# Patient Record
Sex: FEMALE | Race: ASIAN | URBAN - METROPOLITAN AREA
[De-identification: names, ages, dates, MRNs, and addresses within clinical notes are randomized per-mention and may not be internally consistent; named-entity substitution may affect disease eponyms.]

---

## 2019-06-03 ENCOUNTER — APPOINTMENT (OUTPATIENT)
Dept: ULTRASOUND IMAGING | Facility: CLINIC | Age: 34
End: 2019-06-03
Attending: EMERGENCY MEDICINE
Payer: COMMERCIAL

## 2019-06-03 ENCOUNTER — HOSPITAL ENCOUNTER (EMERGENCY)
Facility: CLINIC | Age: 34
Discharge: HOME | End: 2019-06-03
Attending: EMERGENCY MEDICINE | Admitting: EMERGENCY MEDICINE
Payer: COMMERCIAL

## 2019-06-03 VITALS
SYSTOLIC BLOOD PRESSURE: 139 MMHG | RESPIRATION RATE: 14 BRPM | OXYGEN SATURATION: 97 % | TEMPERATURE: 98.2 F | HEART RATE: 78 BPM | DIASTOLIC BLOOD PRESSURE: 91 MMHG

## 2019-06-03 DIAGNOSIS — K52.9 GASTROENTERITIS: ICD-10-CM

## 2019-06-03 LAB
ALBUMIN SERPL-MCNC: 4.1 G/DL (ref 3.4–5)
ALBUMIN UR-MCNC: NEGATIVE MG/DL
ALP SERPL-CCNC: 82 U/L (ref 40–150)
ALT SERPL W P-5'-P-CCNC: 21 U/L (ref 0–50)
ANION GAP SERPL CALCULATED.3IONS-SCNC: 7 MMOL/L (ref 3–14)
APPEARANCE UR: CLEAR
AST SERPL W P-5'-P-CCNC: 17 U/L (ref 0–45)
BACTERIA #/AREA URNS HPF: ABNORMAL /HPF
BASOPHILS # BLD AUTO: 0 10E9/L (ref 0–0.2)
BASOPHILS NFR BLD AUTO: 0.3 %
BILIRUB SERPL-MCNC: 0.6 MG/DL (ref 0.2–1.3)
BILIRUB UR QL STRIP: NEGATIVE
BUN SERPL-MCNC: 10 MG/DL (ref 7–30)
CALCIUM SERPL-MCNC: 9 MG/DL (ref 8.5–10.1)
CHLORIDE SERPL-SCNC: 105 MMOL/L (ref 94–109)
CO2 SERPL-SCNC: 24 MMOL/L (ref 20–32)
COLOR UR AUTO: ABNORMAL
CREAT SERPL-MCNC: 0.84 MG/DL (ref 0.52–1.04)
DIFFERENTIAL METHOD BLD: ABNORMAL
EOSINOPHIL # BLD AUTO: 0 10E9/L (ref 0–0.7)
EOSINOPHIL NFR BLD AUTO: 0.5 %
ERYTHROCYTE [DISTWIDTH] IN BLOOD BY AUTOMATED COUNT: 16.4 % (ref 10–15)
GFR SERPL CREATININE-BSD FRML MDRD: >90 ML/MIN/{1.73_M2}
GLUCOSE SERPL-MCNC: 119 MG/DL (ref 70–99)
GLUCOSE UR STRIP-MCNC: NEGATIVE MG/DL
HCG SERPL QL: NEGATIVE
HCT VFR BLD AUTO: 38.9 % (ref 35–47)
HGB BLD-MCNC: 12.3 G/DL (ref 11.7–15.7)
HGB UR QL STRIP: NEGATIVE
IMM GRANULOCYTES # BLD: 0 10E9/L (ref 0–0.4)
IMM GRANULOCYTES NFR BLD: 0.5 %
INTERPRETATION ECG - MUSE: NORMAL
KETONES UR STRIP-MCNC: NEGATIVE MG/DL
LEUKOCYTE ESTERASE UR QL STRIP: ABNORMAL
LIPASE SERPL-CCNC: 62 U/L (ref 73–393)
LYMPHOCYTES # BLD AUTO: 2.1 10E9/L (ref 0.8–5.3)
LYMPHOCYTES NFR BLD AUTO: 26.5 %
MCH RBC QN AUTO: 24.6 PG (ref 26.5–33)
MCHC RBC AUTO-ENTMCNC: 31.6 G/DL (ref 31.5–36.5)
MCV RBC AUTO: 78 FL (ref 78–100)
MONOCYTES # BLD AUTO: 0.4 10E9/L (ref 0–1.3)
MONOCYTES NFR BLD AUTO: 5.3 %
MUCOUS THREADS #/AREA URNS LPF: PRESENT /LPF
NEUTROPHILS # BLD AUTO: 5.3 10E9/L (ref 1.6–8.3)
NEUTROPHILS NFR BLD AUTO: 66.9 %
NITRATE UR QL: NEGATIVE
NRBC # BLD AUTO: 0 10*3/UL
NRBC BLD AUTO-RTO: 0 /100
PH UR STRIP: 6 PH (ref 5–7)
PLATELET # BLD AUTO: 281 10E9/L (ref 150–450)
POTASSIUM SERPL-SCNC: 3.9 MMOL/L (ref 3.4–5.3)
PROT SERPL-MCNC: 8.7 G/DL (ref 6.8–8.8)
RBC # BLD AUTO: 4.99 10E12/L (ref 3.8–5.2)
RBC #/AREA URNS AUTO: 1 /HPF (ref 0–2)
SODIUM SERPL-SCNC: 136 MMOL/L (ref 133–144)
SOURCE: ABNORMAL
SP GR UR STRIP: 1.01 (ref 1–1.03)
SQUAMOUS #/AREA URNS AUTO: 5 /HPF (ref 0–1)
UROBILINOGEN UR STRIP-MCNC: NORMAL MG/DL (ref 0–2)
WBC # BLD AUTO: 7.9 10E9/L (ref 4–11)
WBC #/AREA URNS AUTO: 4 /HPF (ref 0–5)

## 2019-06-03 PROCEDURE — 85025 COMPLETE CBC W/AUTO DIFF WBC: CPT | Performed by: EMERGENCY MEDICINE

## 2019-06-03 PROCEDURE — 96361 HYDRATE IV INFUSION ADD-ON: CPT

## 2019-06-03 PROCEDURE — 76700 US EXAM ABDOM COMPLETE: CPT

## 2019-06-03 PROCEDURE — 81001 URINALYSIS AUTO W/SCOPE: CPT | Performed by: EMERGENCY MEDICINE

## 2019-06-03 PROCEDURE — 96374 THER/PROPH/DIAG INJ IV PUSH: CPT

## 2019-06-03 PROCEDURE — 25000125 ZZHC RX 250: Performed by: EMERGENCY MEDICINE

## 2019-06-03 PROCEDURE — 84703 CHORIONIC GONADOTROPIN ASSAY: CPT | Performed by: EMERGENCY MEDICINE

## 2019-06-03 PROCEDURE — 25000128 H RX IP 250 OP 636: Performed by: EMERGENCY MEDICINE

## 2019-06-03 PROCEDURE — 93005 ELECTROCARDIOGRAM TRACING: CPT

## 2019-06-03 PROCEDURE — 83690 ASSAY OF LIPASE: CPT | Performed by: EMERGENCY MEDICINE

## 2019-06-03 PROCEDURE — 96375 TX/PRO/DX INJ NEW DRUG ADDON: CPT

## 2019-06-03 PROCEDURE — 99285 EMERGENCY DEPT VISIT HI MDM: CPT | Mod: 25

## 2019-06-03 PROCEDURE — 80053 COMPREHEN METABOLIC PANEL: CPT | Performed by: EMERGENCY MEDICINE

## 2019-06-03 RX ORDER — MORPHINE SULFATE 4 MG/ML
4 INJECTION, SOLUTION INTRAMUSCULAR; INTRAVENOUS
Status: COMPLETED | OUTPATIENT
Start: 2019-06-03 | End: 2019-06-03

## 2019-06-03 RX ORDER — SODIUM CHLORIDE 9 MG/ML
1000 INJECTION, SOLUTION INTRAVENOUS CONTINUOUS
Status: DISCONTINUED | OUTPATIENT
Start: 2019-06-03 | End: 2019-06-03 | Stop reason: HOSPADM

## 2019-06-03 RX ORDER — OXYCODONE HYDROCHLORIDE 5 MG/1
5 TABLET ORAL EVERY 6 HOURS PRN
Qty: 5 TABLET | Refills: 0 | Status: SHIPPED | OUTPATIENT
Start: 2019-06-03

## 2019-06-03 RX ORDER — ONDANSETRON 2 MG/ML
4 INJECTION INTRAMUSCULAR; INTRAVENOUS ONCE
Status: COMPLETED | OUTPATIENT
Start: 2019-06-03 | End: 2019-06-03

## 2019-06-03 RX ORDER — METOCLOPRAMIDE HYDROCHLORIDE 5 MG/ML
10 INJECTION INTRAMUSCULAR; INTRAVENOUS ONCE
Status: COMPLETED | OUTPATIENT
Start: 2019-06-03 | End: 2019-06-03

## 2019-06-03 RX ORDER — ONDANSETRON 4 MG/1
4 TABLET, ORALLY DISINTEGRATING ORAL EVERY 6 HOURS PRN
Qty: 10 TABLET | Refills: 0 | Status: SHIPPED | OUTPATIENT
Start: 2019-06-03 | End: 2019-06-06

## 2019-06-03 RX ADMIN — MORPHINE SULFATE 4 MG: 4 INJECTION INTRAVENOUS at 02:47

## 2019-06-03 RX ADMIN — ONDANSETRON 4 MG: 2 INJECTION INTRAMUSCULAR; INTRAVENOUS at 01:02

## 2019-06-03 RX ADMIN — FAMOTIDINE 20 MG: 10 INJECTION INTRAVENOUS at 02:43

## 2019-06-03 RX ADMIN — METOCLOPRAMIDE 10 MG: 5 INJECTION, SOLUTION INTRAMUSCULAR; INTRAVENOUS at 02:45

## 2019-06-03 RX ADMIN — SODIUM CHLORIDE 1000 ML: 9 INJECTION, SOLUTION INTRAVENOUS at 02:15

## 2019-06-03 ASSESSMENT — ENCOUNTER SYMPTOMS
ABDOMINAL PAIN: 1
FEVER: 0
BLOOD IN STOOL: 0
DYSURIA: 0
HEMATURIA: 0
DIARRHEA: 1
NAUSEA: 1
VOMITING: 1

## 2019-06-03 NOTE — ED TRIAGE NOTES
Patient reports upper abd pain that developed  approx 1800, diarrhea, nausea and had 1 emesis

## 2019-06-03 NOTE — ED AVS SNAPSHOT
[facility] Emergency Department  [address]  [facility] [address]  Phone:  [phone]  Fax:  [phone]                                    Maria Del Rosario Robertson   MRN: [id number]    Department:  [facility] Emergency Department   Date of Visit:  6/3/2019           After Visit Summary Signature Page    I have received my discharge instructions, and my questions have been answered. I have discussed any challenges I see with this plan with the nurse or doctor.    ..........................................................................................................................................  Patient/Patient Representative Signature      ..........................................................................................................................................  Patient Representative Print Name and Relationship to Patient    ..................................................               ................................................  Date                                   Time    ..........................................................................................................................................  Reviewed by Signature/Title    ...................................................              ..............................................  Date                                               Time          22EPIC Rev 08/18

## 2025-07-16 ENCOUNTER — HOSPITAL ENCOUNTER (EMERGENCY)
Facility: CLINIC | Age: 40
Discharge: HOME | End: 2025-07-17
Payer: COMMERCIAL

## 2025-07-16 ENCOUNTER — APPOINTMENT (OUTPATIENT)
Dept: ULTRASOUND IMAGING | Facility: CLINIC | Age: 40
End: 2025-07-16
Payer: COMMERCIAL

## 2025-07-16 DIAGNOSIS — I82.461 ACUTE DEEP VEIN THROMBOSIS (DVT) OF CALF MUSCLE VEIN OF RIGHT LOWER EXTREMITY (H): Primary | ICD-10-CM

## 2025-07-16 DIAGNOSIS — I71.21 ANEURYSM OF ASCENDING AORTA WITHOUT RUPTURE: ICD-10-CM

## 2025-07-16 DIAGNOSIS — D64.9 ANEMIA: ICD-10-CM

## 2025-07-16 DIAGNOSIS — R03.0 ELEVATED BLOOD PRESSURE READING: ICD-10-CM

## 2025-07-16 LAB
ALBUMIN SERPL BCG-MCNC: 4.1 G/DL (ref 3.5–5.2)
ALP SERPL-CCNC: 64 U/L (ref 40–150)
ALT SERPL W P-5'-P-CCNC: 19 U/L (ref 0–50)
ANION GAP SERPL CALCULATED.3IONS-SCNC: 12 MMOL/L (ref 7–15)
APTT PPP: 28 SECONDS (ref 22–38)
AST SERPL W P-5'-P-CCNC: 26 U/L (ref 0–45)
BASOPHILS # BLD AUTO: 0 10E3/UL (ref 0–0.2)
BASOPHILS NFR BLD AUTO: 0 %
BILIRUB SERPL-MCNC: 0.2 MG/DL
BUN SERPL-MCNC: 11.9 MG/DL (ref 6–20)
CALCIUM SERPL-MCNC: 9.2 MG/DL (ref 8.8–10.4)
CHLORIDE SERPL-SCNC: 104 MMOL/L (ref 98–107)
CREAT SERPL-MCNC: 0.84 MG/DL (ref 0.51–0.95)
D DIMER PPP FEU-MCNC: 2.72 UG/ML FEU (ref 0–0.5)
EGFRCR SERPLBLD CKD-EPI 2021: 88 ML/MIN/1.73M2
EOSINOPHIL # BLD AUTO: 0.1 10E3/UL (ref 0–0.7)
EOSINOPHIL NFR BLD AUTO: 2 %
ERYTHROCYTE [DISTWIDTH] IN BLOOD BY AUTOMATED COUNT: 16.6 % (ref 10–15)
GLUCOSE SERPL-MCNC: 86 MG/DL (ref 70–99)
HCG SERPL QL: NEGATIVE
HCO3 SERPL-SCNC: 22 MMOL/L (ref 22–29)
HCT VFR BLD AUTO: 34.2 % (ref 35–47)
HGB BLD-MCNC: 10.9 G/DL (ref 11.7–15.7)
IMM GRANULOCYTES # BLD: 0 10E3/UL
IMM GRANULOCYTES NFR BLD: 0 %
INR PPP: 0.96 (ref 0.85–1.15)
LYMPHOCYTES # BLD AUTO: 3.5 10E3/UL (ref 0.8–5.3)
LYMPHOCYTES NFR BLD AUTO: 56 %
MCH RBC QN AUTO: 24.9 PG (ref 26.5–33)
MCHC RBC AUTO-ENTMCNC: 31.9 G/DL (ref 31.5–36.5)
MCV RBC AUTO: 78 FL (ref 78–100)
MONOCYTES # BLD AUTO: 0.4 10E3/UL (ref 0–1.3)
MONOCYTES NFR BLD AUTO: 7 %
NEUTROPHILS # BLD AUTO: 2.2 10E3/UL (ref 1.6–8.3)
NEUTROPHILS NFR BLD AUTO: 35 %
NRBC # BLD AUTO: 0 10E3/UL
NRBC BLD AUTO-RTO: 0 /100
PLATELET # BLD AUTO: 268 10E3/UL (ref 150–450)
POTASSIUM SERPL-SCNC: 3.9 MMOL/L (ref 3.4–5.3)
PROT SERPL-MCNC: 7.5 G/DL (ref 6.4–8.3)
PROTHROMBIN TIME: 12.9 SECONDS (ref 11.8–14.8)
RBC # BLD AUTO: 4.38 10E6/UL (ref 3.8–5.2)
SODIUM SERPL-SCNC: 138 MMOL/L (ref 135–145)
WBC # BLD AUTO: 6.3 10E3/UL (ref 4–11)

## 2025-07-16 PROCEDURE — 85730 THROMBOPLASTIN TIME PARTIAL: CPT

## 2025-07-16 PROCEDURE — 85379 FIBRIN DEGRADATION QUANT: CPT

## 2025-07-16 PROCEDURE — 93971 EXTREMITY STUDY: CPT | Mod: RT

## 2025-07-16 PROCEDURE — 85610 PROTHROMBIN TIME: CPT

## 2025-07-16 PROCEDURE — 36415 COLL VENOUS BLD VENIPUNCTURE: CPT

## 2025-07-16 PROCEDURE — 85025 COMPLETE CBC W/AUTO DIFF WBC: CPT

## 2025-07-16 PROCEDURE — 84703 CHORIONIC GONADOTROPIN ASSAY: CPT

## 2025-07-16 PROCEDURE — 82040 ASSAY OF SERUM ALBUMIN: CPT

## 2025-07-16 PROCEDURE — 250N000013 HC RX MED GY IP 250 OP 250 PS 637

## 2025-07-16 RX ORDER — LISINOPRIL 10 MG/1
10 TABLET ORAL DAILY
COMMUNITY
Start: 2025-07-03

## 2025-07-16 RX ORDER — APIXABAN 5 MG (74)
KIT ORAL
Qty: 1 EACH | Refills: 0 | Status: SHIPPED | OUTPATIENT
Start: 2025-07-16 | End: 2025-07-17

## 2025-07-16 RX ORDER — TIRZEPATIDE 10 MG/.5ML
INJECTION, SOLUTION SUBCUTANEOUS
COMMUNITY
Start: 2025-05-22

## 2025-07-16 RX ORDER — BUPROPION HYDROCHLORIDE 150 MG/1
150 TABLET ORAL EVERY MORNING
COMMUNITY
Start: 2024-12-09

## 2025-07-16 RX ADMIN — APIXABAN 10 MG: 5 TABLET, FILM COATED ORAL at 23:20

## 2025-07-16 ASSESSMENT — ACTIVITIES OF DAILY LIVING (ADL)
ADLS_ACUITY_SCORE: 41

## 2025-07-16 ASSESSMENT — COLUMBIA-SUICIDE SEVERITY RATING SCALE - C-SSRS
6. HAVE YOU EVER DONE ANYTHING, STARTED TO DO ANYTHING, OR PREPARED TO DO ANYTHING TO END YOUR LIFE?: NO
2. HAVE YOU ACTUALLY HAD ANY THOUGHTS OF KILLING YOURSELF IN THE PAST MONTH?: NO
1. IN THE PAST MONTH, HAVE YOU WISHED YOU WERE DEAD OR WISHED YOU COULD GO TO SLEEP AND NOT WAKE UP?: NO

## 2025-07-17 ENCOUNTER — TELEPHONE (OUTPATIENT)
Dept: OTHER | Facility: CLINIC | Age: 40
End: 2025-07-17
Payer: COMMERCIAL

## 2025-07-17 ENCOUNTER — APPOINTMENT (OUTPATIENT)
Dept: CT IMAGING | Facility: CLINIC | Age: 40
End: 2025-07-17
Payer: COMMERCIAL

## 2025-07-17 VITALS
RESPIRATION RATE: 18 BRPM | DIASTOLIC BLOOD PRESSURE: 99 MMHG | OXYGEN SATURATION: 99 % | TEMPERATURE: 97.3 F | HEART RATE: 79 BPM | SYSTOLIC BLOOD PRESSURE: 134 MMHG | WEIGHT: 169.75 LBS

## 2025-07-17 LAB
ATRIAL RATE - MUSE: 84 BPM
DIASTOLIC BLOOD PRESSURE - MUSE: NORMAL MMHG
INTERPRETATION ECG - MUSE: NORMAL
P AXIS - MUSE: 37 DEGREES
PR INTERVAL - MUSE: 172 MS
QRS DURATION - MUSE: 90 MS
QT - MUSE: 382 MS
QTC - MUSE: 451 MS
R AXIS - MUSE: 18 DEGREES
SYSTOLIC BLOOD PRESSURE - MUSE: NORMAL MMHG
T AXIS - MUSE: 31 DEGREES
VENTRICULAR RATE- MUSE: 84 BPM

## 2025-07-17 PROCEDURE — 250N000011 HC RX IP 250 OP 636

## 2025-07-17 PROCEDURE — 71275 CT ANGIOGRAPHY CHEST: CPT

## 2025-07-17 RX ORDER — IOPAMIDOL 755 MG/ML
500 INJECTION, SOLUTION INTRAVASCULAR ONCE
Status: COMPLETED | OUTPATIENT
Start: 2025-07-17 | End: 2025-07-17

## 2025-07-17 RX ADMIN — IOPAMIDOL 70 ML: 755 INJECTION, SOLUTION INTRAVENOUS at 00:57

## 2025-07-17 ASSESSMENT — ACTIVITIES OF DAILY LIVING (ADL)
ADLS_ACUITY_SCORE: 41
ADLS_ACUITY_SCORE: 41

## 2025-07-17 NOTE — ED NOTES
Bed: ED17  Expected date: 7/17/25  Expected time: 12:02 AM  Means of arrival:   Comments:  ED41, clean

## 2025-07-17 NOTE — ED PROVIDER NOTES
Emergency Department Note      History of Present Illness     Chief Complaint   Knee Pain    HPI   Maria Del Rosario Robertson is a 40 year old female with a history of hypertension, who presents to the emergency department today for evaluation of knee pain. The patient reports she developed right knee pain yesterday as she was walking off the plane from [place]. Maria Del Rosario did attempt to stretch out her knee, which in addition to exertion, makes the pain exacerbated. She did note that a heating pack did palliate the pain, but ibuprofen and Tylenol, which she took 2 hours before presentation to the ED, did not palliate her pain. Maria Del Rosario does note today that her pain became worse, and that she did have one momentary episode of a right calf cramp. She notes that she does get knee pains after runs, but that the pain feels different than this episode. Maria Del Rosario reports a potential for a maternal history of blood clots. She denies any radiation of the pain, history of blood clots, recent injuries, prior knee injuries, knee surgeries, chest pain, shortness of breath, or coughing up blood.     Independent Historian   None    Review of External Notes   None    Past Medical History     Medical History and Problem List   Hypertension  Elevated blood pressure  Preeclampsia pregnancy (H)  Dizziness (H)  Palpitations (H)    Medications   buPROPion   lisinopril   ZEPBOUND  oxyCODONE   Tirzepatide     Physical Exam     Patient Vitals for the past 24 hrs:   BP Temp Temp src Pulse Resp SpO2 Weight   07/17/25 0116 (!) 134/99 -- -- 79 18 99 % --   07/16/25 1954 (!) 136/101 97.3  F (36.3  C) Temporal 77 18 100 % 77 kg (169 lb 12.1 oz)     Physical Exam  BP (!) 134/99   Pulse 79   Temp 97.3  F (36.3  C) (Temporal)   Resp 18   Wt 77 kg (169 lb 12.1 oz)   SpO2 99%    General: No acute distress. Accompanied by spouse.  Head: Atraumatic.   EENT: Moist mucus membranes.   CV: Regular rate and rhythm.   Respiratory: Breathing comfortably on room air.  Lungs clear to auscultation bilaterally without wheezes, rhonchi, or rales.  GI: Soft, non-distended. Non-tender abdomen. No rebound, rigidity, or guarding.   Msk: Right calf is tender in the popliteal space to the right mid calf.  2+ DP pulse.    Skin: Warm and dry. No evidence of cerulea dolens.  No mottling of the skin.   Neuro: Awake, alert, and conversant. No focal neurologic deficits.     Diagnostics     Lab Results   Labs Ordered and Resulted from Time of ED Arrival to Time of ED Departure   CBC WITH PLATELETS AND DIFFERENTIAL - Abnormal       Result Value    WBC Count 6.3      RBC Count 4.38      Hemoglobin 10.9 (*)     Hematocrit 34.2 (*)     MCV 78      MCH 24.9 (*)     MCHC 31.9      RDW 16.6 (*)     Platelet Count 268      % Neutrophils 35      % Lymphocytes 56      % Monocytes 7      % Eosinophils 2      % Basophils 0      % Immature Granulocytes 0      NRBCs per 100 WBC 0      Absolute Neutrophils 2.2      Absolute Lymphocytes 3.5      Absolute Monocytes 0.4      Absolute Eosinophils 0.1      Absolute Basophils 0.0      Absolute Immature Granulocytes 0.0      Absolute NRBCs 0.0     D DIMER QUANTITATIVE - Abnormal    D-Dimer Quantitative 2.72 (*)    COMPREHENSIVE METABOLIC PANEL - Normal    Sodium 138      Potassium 3.9      Carbon Dioxide (CO2) 22      Anion Gap 12      Urea Nitrogen 11.9      Creatinine 0.84      GFR Estimate 88      Calcium 9.2      Chloride 104      Glucose 86      Alkaline Phosphatase 64      AST 26      ALT 19      Protein Total 7.5      Albumin 4.1      Bilirubin Total 0.2     HCG QUALITATIVE PREGNANCY - Normal    hCG Serum Qualitative Negative     INR - Normal    INR 0.96      PT 12.9     PARTIAL THROMBOPLASTIN TIME - Normal    aPTT 28       EKG:  ECG results from 07/16/25   EKG 12 lead     Value    Systolic Blood Pressure     Diastolic Blood Pressure     Ventricular Rate 84    Atrial Rate 84    DC Interval 172    QRS Duration 90        QTc 451    P Axis 37    R AXIS 18     T Axis 31    Interpretation ECG      Sinus rhythm  Normal ECG  No previous ECGs available  Unconfirmed report - interpretation of this ECG is computer generated - see medical record for final interpretation  Confirmed by - EMERGENCY ROOM, PHYSICIAN ([id number]),  ANIL LEAHY ([id number]) on 7/17/2025 6:40:47 AM         Imaging   CT Chest Pulmonary Embolism w Contrast   Final Result   IMPRESSION:   1.  Negative for pulmonary embolism.      2.  Aneurysmal dilatation of the ascending thoracic aorta measuring 4.3 cm. Aorta is not opacified well enough to evaluate for dissection.      3.  Mild dependent atelectasis in the lungs. No evidence for pneumonitis.      US Lower Extremity Venous Duplex Right   Final Result   IMPRESSION:   1.  Deep vein thrombosis in the right gastrocnemius veins from the proximal to the proximal/mid calf.      NOTE: ABNORMAL REPORT      THE DICTATION ABOVE DESCRIBES AN ABNORMALITY FOR WHICH FOLLOW-UP IS NEEDED.         Independent Interpretation   None    ED Course      Medications Administered   Medications   apixaban ANTICOAGULANT (ELIQUIS) tablet 10 mg (10 mg Oral $Given 7/16/25 2320)   sodium chloride (PF) 0.9% PF flush 100 mL (90 mLs Intravenous $Given 7/17/25 0057)   iopamidol (ISOVUE-370) solution 500 mL (70 mLs Intravenous $Given 7/17/25 0057)       Procedures   Procedures     Discussion of Management   None    ED Course   ED Course as of 07/17/25 0231   Wed Jul 16, 2025 2106 I obtained history and examined the patient as noted above.     2207 I rechecked with patient and explained findings     2312 I explained findings to the patient and we discussed plan for discharge. The patient is comfortable with this plan, patient mentions intermittent reflux type symptoms, concerned for PE, will initiate work up for this. Discussed troponins as well, patient and spouse prefer to focus on PE work up.      Additional Documentation  None    Medical Decision Making / Diagnosis     CMS Diagnoses:  None    MIPS   CT for PE was ordered because the patient had an abnormal d-dimer.      IDRIS Robertson is a 40 year old female who presents for evaluation of leg symptoms including calf pain and popliteal fossa pain.  Doppler ultrasound demonstrated a deep venous thrombosis.  This was discussed with the patient.  This is a provoked DVT.  Initially, plan was to discharge patient after blood work and ultrasound, however she then noted some intermittent panic type symptoms, reporting concerns for pulmonary embolism.  We initiated workup for that and there is an elevated D-dimer at 2.72.  His CT PE confirms no evidence of pulmonary embolism, but does unfortunately show an aneurysm of the ascending aorta 4.2 cm.  This has not been previously documented.  Referral placed to vascular surgery.  She does have very mild anemia at 10.9, she denies any black or bloody stools.  She has no history of bleeding problems.  There is no contraindication to anticoagulation at this time.  We discussed risks associated with starting anticoagulation.  Patient spouse is a hospitalist.  EKG with normal sinus rhythm.  Remainder of blood work normal.  I do not suspect the patient is having aortic dissection she is not describing any chest pain or tingling in the arms at this time.  Though I did discuss with the patient that she is at risk for this with the ascending aortic aneurysm.  Patient initially was given Eliquis due to my belief that we had the coupons for 1 month free of Eliquis; however, we were unable to locate those, thus patient will transition to Xarelto tomorrow.  This should not be a problem as she has only had 1 dose of Eliquis.  She will initiate DOAC treatment.  Will follow-up closely with primary care.  Once again, this was a provoked DVT.  She will certainly return for  chest pain, shortness of breath, coughing up blood, worsening calf pain or with other concerns.  Otherwise will follow-up with primary care in the  next 3 to 5 days.  Patient and spouse had full understanding of the plan and were discharged home in improved condition.    Disposition   The patient was discharged.     Diagnosis     ICD-10-CM    1. Acute deep vein thrombosis (DVT) of calf muscle vein of right lower extremity (H)  I82.461       2. Anemia  D64.9       3. Elevated blood pressure reading  R03.0       4. Aneurysm of ascending aorta without rupture  I71.21 Vascular Surgery Referral         Discharge Medications   Discharge Medication List as of 7/17/2025  2:19 AM        START taking these medications    Details   Rivaroxaban ANTICOAGULANT 15 & 20 MG TBPK Starter Therapy Pack Take 15 mg by mouth 2 times daily (with meals) for 21 days, THEN 20 mg daily with food for 9 days. Continue as directed by provider., Disp-51 each, R-0, E-Prescribe           Scribe Disclosure:  I, Justus Greenberg, am serving as a scribe at 8:05 PM on 7/16/2025 to document services personally performed by Le Gates PA-C based on my observations and the provider's statements to me.        Le Gates PA-C  07/17/25 0231       Le Gates PA-C  07/17/25 1316       Le Gates PA-C  07/17/25 1316

## 2025-07-17 NOTE — DISCHARGE INSTRUCTIONS
Xarelto twice daily with meals for 21 daily then once daily with food    Hemoglobin low at 10.9 -- have rechecked with primary care provider    Isolated right calf vein, nothing to suggest blood clot in lungs at this time, return for chest pain, difficulty breathing, coughing up blood    Need to follow up with primary care provider in next 3-5 days.      If you developed blood in urine, stools or if you hit your head, blood in stools, severe chest pain    Follow up with Vascular surgery for evaluation of ascending aortic aneurysm

## 2025-07-17 NOTE — TELEPHONE ENCOUNTER
Referral received via [employer] on 07/17/25.    Referred by Le Gates PA-C for ascending thoracic AA.    Previous imaging completed (pertinent to referral):  07/17/25 CT Chest Pulmonary Embolism    Routing to scheduling to coordinate the following:  NEW VASCULAR PATIENT consult with Vascular Medicine  Please schedule this at next available        Appt note:  Referred by Le Gates PA-C for ascending thoracic AA.

## 2025-07-17 NOTE — ED TRIAGE NOTES
Pt. Presents to ED with complaints of pain in the back of her R knee that worsened around 1400. Reports it started yesterday after returning from [place]. Denies pain in the calf but reports some cramping. Denies redness/swelling in calf/knee. Denies fevers. Denies hx of clots or blood thinners. Reports heating pad improved her symptoms.

## 2025-07-21 NOTE — TELEPHONE ENCOUNTER
Spoke to patient she declined scheduling an appointment.She is going to be treated at [facility]. Patient has [facility] phone number if she changes her mind.

## 2025-08-02 ENCOUNTER — HEALTH MAINTENANCE LETTER (OUTPATIENT)
Age: 40
End: 2025-08-02

## (undated) RX ORDER — ONDANSETRON 2 MG/ML
INJECTION INTRAMUSCULAR; INTRAVENOUS
Status: DISPENSED
Start: 2019-06-03